# Patient Record
Sex: FEMALE | Race: WHITE | NOT HISPANIC OR LATINO | URBAN - NONMETROPOLITAN AREA
[De-identification: names, ages, dates, MRNs, and addresses within clinical notes are randomized per-mention and may not be internally consistent; named-entity substitution may affect disease eponyms.]

---

## 2018-12-12 NOTE — PATIENT DISCUSSION
Tight BS control discussed.  No DME.  Discussed the importance of blood sugar control in the prevention of ocular complications.

## 2018-12-12 NOTE — PATIENT DISCUSSION
Not active.  Tight BS control discussed with patient. Discussed the importance of blood sugar control in the prevention of ocular complications.

## 2019-04-17 NOTE — PATIENT DISCUSSION
May have a fungal infection see [facility]  for culture and Sukhjinder Weir for follow up.

## 2019-04-17 NOTE — PROCEDURE NOTE: CLINICAL
PROCEDURE NOTE: Debridement, Cornea OS. Topical anesthesia was instilled, and the patient was positioned at the slit-lamp. The corneal epithelium was removed in the area of superficial corneal pathology, together with any subepithelial fibrosis encountered. A bandage contact lens was placed and adequate fit confirmed. Antibiotic-steroid prescribed for use pending scheduled follow-up. Delbert Adams PROCEDURE NOTE: Bandage Contact Lens OS. Diagnosis: Corneal Abrasion. A therapeutic soft contact lens of the of the appropriate size and base curve was selected and then applied to the cornea. The lens fit well and moved appropriately. Delbert Adams

## 2019-05-15 NOTE — PATIENT DISCUSSION
Most likely CME, will have pt switch drops to Pred Forte and Ketorolac QID, Return in 1 month. If no improvement, will do Anti-Vegf treatment.

## 2019-06-12 NOTE — PATIENT DISCUSSION
Resolved, pt to taper pred 3gtts for 2 weeks, 2 gtts for 2 weeks then 1 gtts for 2 weeks then stop. Pt to F/U in 3 months.

## 2019-08-20 ENCOUNTER — IMPORTED ENCOUNTER (OUTPATIENT)
Dept: URBAN - NONMETROPOLITAN AREA CLINIC 1 | Facility: CLINIC | Age: 39
End: 2019-08-20

## 2019-08-20 PROCEDURE — 92310 CONTACT LENS FITTING OU: CPT

## 2019-08-20 PROCEDURE — S0621 ROUTINE OPHTHALMOLOGICAL EXA: HCPCS

## 2019-08-20 NOTE — PATIENT DISCUSSION
9/12/2019Pt did not like Biofinity. Gave Trials of B&L Ultra with RX if pt likes. Did let pt know it was a monthly lens. If pt does not like she needs to come in and ltet us try trials. Compound Myopic Astigmatism OU -  discussed findings w/patient-  new spectacle/CL Rx issued-  trials of Biofinity dispensed to patient will order another trial for patient -  patient has difficulty with night time driving recommend glasses for driving at night to reduce glare-  discussed LASIK do not recommend at this time. Discussed refractive lens surgery. -  explained highly nearsighted patients are a higher risk for RT/RD reviewed signs and symptoms associated with RT/RD patient to call or come in if changes in vision are noted from today.  -  patient to call if she has any problems with the new CL Rx-  monitor yearly or prn; 's Notes: MR 8/20/2019CL 8/20/2019DFE defer d/t CL fitting 8/20/2019

## 2019-09-18 NOTE — PATIENT DISCUSSION
Stable, Not active.  Tight BS control discussed with patient. Discussed the importance of blood sugar control in the prevention of ocular complications.

## 2019-10-03 NOTE — PROCEDURE NOTE: SURGICAL
<p>Prior to commencing surgery patient identification, surgical procedure, site, and side were confirmed by Dr. Madeline Coughlin. Following topical proparacaine anesthesia, the patient was positioned at the YAG laser, a contact lens coupled to the cornea with methylcellulose and an axial posterior capsulotomy performed without complication using 3.2 Mj x 112. Excess methylcellulose was washed from the eye, one drop of Alphagan was instilled and the patient returned to the holding area having tolerated the procedure well and without complication. </p><p>MRN:[id number]</p>

## 2020-08-26 ENCOUNTER — IMPORTED ENCOUNTER (OUTPATIENT)
Dept: URBAN - NONMETROPOLITAN AREA CLINIC 1 | Facility: CLINIC | Age: 40
End: 2020-08-26

## 2020-08-26 PROCEDURE — 92310 CONTACT LENS FITTING OU: CPT

## 2020-08-26 PROCEDURE — 92015 DETERMINE REFRACTIVE STATE: CPT

## 2020-08-26 PROCEDURE — 92014 COMPRE OPH EXAM EST PT 1/>: CPT

## 2020-08-26 NOTE — PATIENT DISCUSSION
Compound Myopic Astigmatism OU-  discussed findings w/patient-  new spectacle/CL Rx issued-  recommended +1.00 readers over CL's-  continue to monitor yearly or prn; 's Notes: MR 8/26/2020DFE 8/26/2020

## 2020-11-25 NOTE — PATIENT DISCUSSION
Stable, Minimal, Discussed the importance of blood sugar control in the prevention of ocular complications.

## 2021-09-21 ENCOUNTER — IMPORTED ENCOUNTER (OUTPATIENT)
Dept: URBAN - NONMETROPOLITAN AREA CLINIC 1 | Facility: CLINIC | Age: 41
End: 2021-09-21

## 2021-09-21 PROBLEM — H43.813: Noted: 2021-09-21

## 2021-09-21 PROCEDURE — 92310 CONTACT LENS FITTING OU: CPT

## 2021-09-21 PROCEDURE — 92015 DETERMINE REFRACTIVE STATE: CPT

## 2021-09-21 PROCEDURE — 92014 COMPRE OPH EXAM EST PT 1/>: CPT

## 2021-09-21 NOTE — PATIENT DISCUSSION
Compound Myopic Astigmatism OU-  discussed findings w/patient-  new spectacle Rx issued-  new CL trials given    1) Acuvue Linda-modified  monovision    2) B&L Ultra MF-  ok for patient to call and let us know what she would like to order-  also ok for patient to call and schedule CL f/u if needed-  continue to monitor yearly or prnVitreous Degeneration OU-  discussed findings w/patient-  Retina flat 360 with no breaks tears or heme. -  S&S of RD/RT reviewed with pt. -  Stressed that pt should contact our office right away with any changes or increase in symptoms.-  RTC 1 year or prn; 's Notes: MR 9/21/2021DFE 9/21/2021

## 2021-09-29 NOTE — PATIENT DISCUSSION
No RT/RD, Retinal tear and detachment warning symptoms reviewed and patient instructed to call immediately if increasing floaters, flashes, or decreasing peripheral vision.

## 2022-04-09 ASSESSMENT — TONOMETRY
OS_IOP_MMHG: 18
OS_IOP_MMHG: 20
OS_IOP_MMHG: 17
OD_IOP_MMHG: 20
OD_IOP_MMHG: 17
OD_IOP_MMHG: 21

## 2022-04-09 ASSESSMENT — VISUAL ACUITY
OU_SC: 20/25
OS_SC: 20/20
OD_SC: 20/20
OS_SC: 20/20
OS_SC: 20/25
OU_CC: 20/20
OS_SC: 20/20
OD_SC: 20/20-1
OU_SC: 20/20
OD_SC: 20/20
OD_SC: 20/20
OS_SC: 20/20-1

## 2022-10-20 NOTE — PATIENT DISCUSSION
Monitor. Benefits, risks, and possible complications of procedure explained to patient/caregiver who verbalized understanding and gave verbal consent.

## 2024-05-09 NOTE — PATIENT DISCUSSION
Discussed the importance of blood sugar control in the prevention of ocular complications. Vital Signs Last 24 Hrs  T(C): 36.7 (09 May 2024 23:32), Max: 36.7 (09 May 2024 14:50)  T(F): 98 (09 May 2024 23:32), Max: 98.1 (09 May 2024 22:14)  HR: 77 (09 May 2024 23:32) (74 - 79)  BP: 116/43 (09 May 2024 23:32) (116/43 - 149/73)  BP(mean): 90 (09 May 2024 18:54) (86 - 90)  RR: 18 (09 May 2024 23:32) (16 - 20)  SpO2: 98% (09 May 2024 23:32) (98% - 100%)    PHYSICAL EXAM:  General: Awake and alert.  No acute distress.  Head: Normocephalic, atraumatic.    Eyes: PERRL.  EOMI.  No scleral icterus.  No conjunctival pallor.  Mouth: Moist MM.  No oropharyngeal exudates.    Neck: Supple.  Full range of motion.  No JVD.  No LAD.  No thyromegaly.  Trachea midline.    Heart: RRR.  Normal S1 and S2.  No murmurs, rubs, or gallops.  No LE edema b/l.   Lungs: Nonlabored breathing.  Good inspiratory effort.  CTAB but with slightly diminished breath sounds throughout.  No wheezes, crackles, or rhonchi.    Abdomen: BS+, soft, nontender with no rebound or guarding, nondistended.  No hepatomegaly.   Skin: Warm and dry.  No rashes.  L foot posterior plantar heel wound to dermis with overlying eschar, no erythema, no drainage, no purulence, no fluctuance no palpable retained foreign body. R foot with no open wounds or signs of infection.   Extremities: No cyanosis.  2+ peripheral pulses b/l.  Musculoskeletal: No joint deformities.  No spinal or paraspinal tenderness.  Neuro: A&Ox3.  CN II-XII intact.  5/5 motor strength in UE and LE b/l.  Tactile sensation intact in UE and LE b/l.  No focal deficits.

## 2025-03-01 NOTE — PATIENT DISCUSSION
A bandage contact lens was removed from the left eye with [employer]eler[employer] forcepts at the slit lamp.
Advised to call immediately if any worsening distortion or blurring is noted.
Discussed the importance of blood sugar control in the prevention of ocular complications.
Discussed the risks/benefits of laser capsulotomy. Laser recommended. Patient elects to proceed.
Glasses Prescription given to patient.
Monitor.
No DME present today.
No DME.
No retinal detachment or retinal tear noted.
OTC Reading glasses recommended.
Patient to call or RTC if signs/symtoms worsen.
Patient understands condition, prognosis and need for follow up care.
Recommended observation.
Resolved, no CME. Monitor.
Retinal detachment warnings given.
Stable, Not active.  Tight BS control discussed with patient. Discussed the importance of blood sugar control in the prevention of ocular complications.
Stable.
The absence of macula edema findings were communicated to provider.
The level of diabetic retinopathy was communicated to provider.
Tight BS control discussed.
Private car